# Patient Record
Sex: MALE | Race: BLACK OR AFRICAN AMERICAN | NOT HISPANIC OR LATINO | Employment: FULL TIME | URBAN - METROPOLITAN AREA
[De-identification: names, ages, dates, MRNs, and addresses within clinical notes are randomized per-mention and may not be internally consistent; named-entity substitution may affect disease eponyms.]

---

## 2021-12-16 ENCOUNTER — APPOINTMENT (OUTPATIENT)
Dept: CT IMAGING | Facility: CLINIC | Age: 61
End: 2021-12-16
Payer: COMMERCIAL

## 2021-12-16 ENCOUNTER — HOSPITAL ENCOUNTER (EMERGENCY)
Facility: CLINIC | Age: 61
Discharge: HOME | End: 2021-12-16
Admitting: PHYSICIAN ASSISTANT
Payer: COMMERCIAL

## 2021-12-16 VITALS
SYSTOLIC BLOOD PRESSURE: 168 MMHG | DIASTOLIC BLOOD PRESSURE: 104 MMHG | OXYGEN SATURATION: 96 % | HEART RATE: 93 BPM | WEIGHT: 195 LBS | RESPIRATION RATE: 16 BRPM | TEMPERATURE: 98.7 F
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
## 2021-12-16 DIAGNOSIS — S01.01XA LACERATION OF SCALP, INITIAL ENCOUNTER: ICD-10-CM

## 2021-12-16 DIAGNOSIS — S06.0X1A CONCUSSION WITH LOSS OF CONSCIOUSNESS OF 30 MINUTES OR LESS, INITIAL ENCOUNTER: ICD-10-CM

## 2021-12-16 PROCEDURE — 12001 RPR S/N/AX/GEN/TRNK 2.5CM/<: CPT

## 2021-12-16 PROCEDURE — 90715 TDAP VACCINE 7 YRS/> IM: CPT | Performed by: PHYSICIAN ASSISTANT

## 2021-12-16 PROCEDURE — 90471 IMMUNIZATION ADMIN: CPT | Performed by: PHYSICIAN ASSISTANT

## 2021-12-16 PROCEDURE — 99284 EMERGENCY DEPT VISIT MOD MDM: CPT | Mod: 25

## 2021-12-16 PROCEDURE — 70450 CT HEAD/BRAIN W/O DYE: CPT

## 2021-12-16 PROCEDURE — 250N000011 HC RX IP 250 OP 636: Performed by: PHYSICIAN ASSISTANT

## 2021-12-16 RX ADMIN — CLOSTRIDIUM TETANI TOXOID ANTIGEN (FORMALDEHYDE INACTIVATED), CORYNEBACTERIUM DIPHTHERIAE TOXOID ANTIGEN (FORMALDEHYDE INACTIVATED), BORDETELLA PERTUSSIS TOXOID ANTIGEN (GLUTARALDEHYDE INACTIVATED), BORDETELLA PERTUSSIS FILAMENTOUS HEMAGGLUTININ ANTIGEN (FORMALDEHYDE INACTIVATED), BORDETELLA PERTUSSIS PERTACTIN ANTIGEN, AND BORDETELLA PERTUSSIS FIMBRIAE 2/3 ANTIGEN 0.5 ML: 5; 2; 2.5; 5; 3; 5 INJECTION, SUSPENSION INTRAMUSCULAR at 12:58

## 2021-12-16 ASSESSMENT — ENCOUNTER SYMPTOMS
BACK PAIN: 0
NECK PAIN: 0
WOUND: 1

## 2021-12-16 NOTE — DISCHARGE INSTRUCTIONS
Laceration cleaned and approximated using skin glue. This will begin to flake off in about 5 days. Okay to get wet once completely dry. Do not apply any creams, ointments or lotions over the glue.   You likely have a concussion.  Symptoms of a concussion include: Headaches, fatigue, emotional lability (anxiety, sadness/tearfulness, mood swings, etc.), light sensitivity, sound sensitivity,and nausea.   Rest at home for the next few days to give your brain time to heal. Limit mental exertion, especially if you start to feel worsening of your symptoms.   No contact sports until symptoms arecompletely resolved. Additionally, avoid any activities that may cause a second injury to your brain. Be very cautious while on the ice.  For pain: You may use tylenol 650 mg and ibuprofen 600 mg every 6-8 hours as needed.   Return to the emergency department if you develop fevers, worsening headaches, new dizziness,vision changes, numbness/tingling/weakness in your arms or legs, vomiting, or any other concerning symptoms.

## 2021-12-16 NOTE — Clinical Note
Paul Garcia was seen and treated in our emergency department on 12/16/2021.  He may return to work on 12/18/2021.       If you have any questions or concerns, please don't hesitate to call.      Dianne Perera RN

## 2021-12-16 NOTE — ED PROVIDER NOTES
EMERGENCY DEPARTMENT ENCOUNTER      NAME: Paul Garcia  AGE: 61 year old male  YOB: 1960  MRN: [id number]  EVALUATION DATE & TIME: 12/16/2021 12:32 PM    PCP: No primary care provider on file.    ED PROVIDER: Stefanie Juarez PA-C      Chief Complaint   Patient presents with     Head Laceration     Fall         FINAL IMPRESSION:  1. Fall due to slipping on ice or snow, initial encounter    2. Concussion with loss of consciousness of 30 minutes or less, initial encounter    3. Laceration of scalp, initial encounter          MEDICAL DECISION MAKING:    Pertinent Labs & Imaging studies reviewed. (See chart for details)  61 year old male presents to the Emergency Department for evaluation after a fall from slipping on ice just prior to arrival. Patient fell backwards hitting his head. Reports brief loss of consciousness. Now has residual headache and a laceration to occipital scalp. Patient is not anticoagulated. Denies any other injuries.     Vitals reviewed and notable for elevated blood pressure, likely secondary to pain. GCS 15. Cervical spine cleared using NEXUS criteria. 2 cm superficial upside down Y shaped laceration to occipital scalp with tenderness to palpation. No other findings on head to toe trauma exam. Differential diagnosis includes but not limited to skull fracture, intracranial hemorrhage, concussion.     Fortunately, head CT unremarkable other than small posterior parieto-occipital scalp contusion. Tdap was updated. Laceration was repaired using surgical glue. Discussed concussion signs and symptoms and return precautions. Close follow up with PCP encouraged. Patient discharged home in stable condition.     0 minutes of critical care time     ED COURSE:  12:49 PM I met with the patient, obtained history, performed an initial exam, and discussed options and plan for diagnostics and treatment here in the ED.  2:17 PM Patient discharged after being provided with extensive anticipatory guidance  and given return precautions, importance of PCP follow-up emphasized.    At the conclusion of the encounter I discussed the results of all of the tests and the disposition. The questions were answered. The patient acknowledged understanding and was agreeable with the care plan.     MEDICATIONS GIVEN IN THE EMERGENCY:  Medications   Tdap (tetanus-diphtheria-acell pertussis) (ADACEL) injection 0.5 mL (0.5 mLs Intramuscular Given 12/16/21 1259)       NEW PRESCRIPTIONS STARTED AT TODAY'S ER VISIT  There are no discharge medications for this patient.           =================================================================    HPI:    Patient information was obtained from: patient     Use of Interpretor: N/A         Paul Garcia is a 61 year old male with no pertinent medical history who presents to this ED by walk in for evaluation of fall.    Patient reports he was walking on the ice when he slipped and fell backwards just prior to arrival. He hit the back of his head on the ground. He reports he lost consciousness for a few minutes and has laceration to scalp. Patient reports a headache. Denies any other injuries. Denies neck pain, back pain, difficulty walking, vision changes, dizziness, light headedness, confusion, nausea, vomiting or any other complaints at this time. Patient is not anticoagulated.     REVIEW OF SYSTEMS:  Review of Systems   Eyes: Negative for visual disturbance.   Respiratory: Negative for shortness of breath.    Cardiovascular: Negative for chest pain.   Gastrointestinal: Negative for abdominal pain, nausea and vomiting.   Musculoskeletal: Negative for back pain, gait problem and neck pain.   Skin: Positive for wound (laceration to back of head).   Neurological: Positive for syncope and headaches. Negative for dizziness, speech difficulty, weakness, light-headedness and numbness.   Hematological: Does not bruise/bleed easily.   Psychiatric/Behavioral: Negative for confusion.   All other systems  reviewed and are negative.    PAST MEDICAL HISTORY:  History reviewed. No pertinent past medical history.    PAST SURGICAL HISTORY:  History reviewed. No pertinent surgical history.    CURRENT MEDICATIONS:    No current facility-administered medications for this encounter.  No current outpatient medications on file.      ALLERGIES:  No Known Allergies    FAMILY HISTORY:  History reviewed. No pertinent family history.    SOCIAL HISTORY:   Social History     Socioeconomic History     Marital status: Not on file     Spouse name: Not on file     Number of children: Not on file     Years of education: Not on file     Highest education level: Not on file   Occupational History     Not on file   Tobacco Use     Smoking status: Not on file     Smokeless tobacco: Not on file   Substance and Sexual Activity     Alcohol use: Not on file     Drug use: Not on file     Sexual activity: Not on file   Other Topics Concern     Not on file   Social History Narrative     Not on file     Social Determinants of Health     Financial Resource Strain: Not on file   Food Insecurity: Not on file   Transportation Needs: Not on file   Physical Activity: Not on file   Stress: Not on file   Social Connections: Not on file   Intimate Partner Violence: Not on file   Housing Stability: Not on file       VITALS:  Patient Vitals for the past 24 hrs:   BP Temp Pulse Resp SpO2 Weight   12/16/21 1228 (!) 168/104 98.7  F (37.1  C) 93 16 96 % 88.5 kg (195 lb)       PHYSICAL EXAM   Constitutional: Well developed, Well nourished, NAD  HENT: Normocephalic. 2 cm superficial upside down Y shaped laceration to occipital scalp with tenderness to palpation. No active bleeding. No visualized foreign bodies. Small underlying scalp hematoma. No palpable depression in skull. Bilateral external ears normal, no hemotympanum. Oropharynx normal. Normal dentition. mucous membranes moist, Nose normal.   Neck: Normal range of motion, No midline cervical spine tenderness. No  pain with axial loading. Supple, No stridor.  Eyes: PERRL, EOMI, Conjunctiva normal, No discharge.   Respiratory: Normal breath sounds, No respiratory distress, No wheezing, Speaks full sentences easily. No cough.  Cardiovascular: Normal heart rate, Regular rhythm, No murmurs, No rubs, No gallops. Chest wall nontender.  GI: Soft, No tenderness, No masses, No flank tenderness. No rebound or guarding.  Musculoskeletal: 2+ DP pulses. No edema. No cyanosis, No clubbing. Good range of motion in all major joints. No tenderness to palpation or major deformities noted. No tenderness of the CTLS spine.   Integument: Warm, Dry, No erythema, No rash. No petechiae.  Neurologic: Patient is alert and oriented ×3. Face is symmetric. Speech is normal.  Visual fields are full. Cranial nerves II through XII are intact. Strength is full and equal in both upper and lower extremities. Sensory is intact. Patient has a normal steady gait. Coordination is intact.  Psychiatric: Affect normal, Judgment normal, Mood normal. Cooperative.    RADIOLOGY:  Reviewed all pertinent imaging. Please see official radiology report.  Head CT w/o contrast   Final Result   IMPRESSION:      No evidence of acute intracranial abnormality. No evidence of acute hemorrhage, mass or CT evidence of acute infarct.      Small posterior parieto-occipital scalp contusion. No acute fracture.        [facility]    -Laceration Repair    Date/Time: 12/16/2021 1:04 PM  Performed by: Stefanie Juarez PA-C  Authorized by: Stefanie Juarez PA-C     Risks, benefits and alternatives discussed.      ANESTHESIA (see MAR for exact dosages):     Anesthesia method:  None  LACERATION DETAILS     Location:  Scalp    Scalp location:  Occipital    Length (cm):  2    REPAIR TYPE:     Repair type:  Simple      EXPLORATION:     Wound exploration: entire depth of wound probed and visualized      Wound extent: no underlying fracture      Contaminated: no       TREATMENT:     Area cleansed with:  Hibiclens and saline    Amount of cleaning:  Standard    Irrigation solution:  Sterile saline    SKIN REPAIR     Repair method:  Tissue adhesive    APPROXIMATION     Approximation:  Close    POST-PROCEDURE DETAILS     Dressing:  Open (no dressing)        PROCEDURE    Patient Tolerance:  Patient tolerated the procedure well with no immediate complications      IGoyo, am serving as a scribe to document services personally performed by Stefanie Juarze PA-C based on my observation and the provider's statements to me. I, Stefanie Juarez PA-C attest that Goyo Navas is acting in a scribe capacity, has observed my performance of the services and has documented them in accordance with my direction.    Stefanie Juarez PA-C  Emergency Medicine  [facility]  12/16/2021     Stefanie Juarez PA-C  12/19/21 1918

## 2021-12-16 NOTE — ED TRIAGE NOTES
Patient is here with a head laceration. He fell on the ice prior to arrival. He stated he did have a LOC but does remember being on the ground and seeing black and could hear people around him.

## 2021-12-19 ASSESSMENT — ENCOUNTER SYMPTOMS
SHORTNESS OF BREATH: 0
HEADACHES: 1
DIZZINESS: 0
ABDOMINAL PAIN: 0
NUMBNESS: 0
VOMITING: 0
LIGHT-HEADEDNESS: 0
NAUSEA: 0
BRUISES/BLEEDS EASILY: 0
SPEECH DIFFICULTY: 0
WEAKNESS: 0
CONFUSION: 0

## 2023-02-28 ENCOUNTER — OFFICE VISIT (OUTPATIENT)
Dept: FAMILY MEDICINE | Facility: CLINIC | Age: 63
End: 2023-02-28
Payer: COMMERCIAL

## 2023-02-28 VITALS
OXYGEN SATURATION: 97 % | DIASTOLIC BLOOD PRESSURE: 80 MMHG | SYSTOLIC BLOOD PRESSURE: 142 MMHG | WEIGHT: 193.1 LBS | RESPIRATION RATE: 18 BRPM | HEIGHT: 70 IN | HEART RATE: 83 BPM | TEMPERATURE: 98.9 F | BODY MASS INDEX: 27.64 KG/M2

## 2023-02-28 DIAGNOSIS — R74.8 ELEVATED LIVER ENZYMES: ICD-10-CM

## 2023-02-28 DIAGNOSIS — Z11.59 NEED FOR HEPATITIS C SCREENING TEST: ICD-10-CM

## 2023-02-28 DIAGNOSIS — M1A.9XX0 CHRONIC GOUT WITHOUT TOPHUS, UNSPECIFIED CAUSE, UNSPECIFIED SITE: ICD-10-CM

## 2023-02-28 DIAGNOSIS — Z12.11 SCREEN FOR COLON CANCER: ICD-10-CM

## 2023-02-28 DIAGNOSIS — Z79.899 CURRENT USE OF PROTON PUMP INHIBITOR: ICD-10-CM

## 2023-02-28 DIAGNOSIS — Z11.4 SCREENING FOR HIV (HUMAN IMMUNODEFICIENCY VIRUS): ICD-10-CM

## 2023-02-28 DIAGNOSIS — F10.10 ETOH ABUSE: ICD-10-CM

## 2023-02-28 DIAGNOSIS — Z00.00 VISIT FOR PREVENTIVE HEALTH EXAMINATION: Primary | ICD-10-CM

## 2023-02-28 DIAGNOSIS — R12 CHRONIC HEARTBURN: ICD-10-CM

## 2023-02-28 DIAGNOSIS — I10 ESSENTIAL HYPERTENSION: ICD-10-CM

## 2023-02-28 DIAGNOSIS — N52.9 ERECTILE DYSFUNCTION, UNSPECIFIED ERECTILE DYSFUNCTION TYPE: ICD-10-CM

## 2023-02-28 DIAGNOSIS — Z63.4 DEATH OF LIFE PARTNER: ICD-10-CM

## 2023-02-28 DIAGNOSIS — E78.5 DYSLIPIDEMIA: ICD-10-CM

## 2023-02-28 PROBLEM — M25.562 CHRONIC PAIN OF LEFT KNEE: Status: ACTIVE | Noted: 2018-01-27

## 2023-02-28 PROBLEM — J96.01 ACUTE RESPIRATORY FAILURE WITH HYPOXIA (H): Status: ACTIVE | Noted: 2021-04-13

## 2023-02-28 PROBLEM — G89.29 CHRONIC PAIN OF LEFT KNEE: Status: ACTIVE | Noted: 2018-01-27

## 2023-02-28 PROBLEM — J12.82 PNEUMONIA DUE TO COVID-19 VIRUS: Status: ACTIVE | Noted: 2021-04-13

## 2023-02-28 PROBLEM — U07.1 PNEUMONIA DUE TO COVID-19 VIRUS: Status: ACTIVE | Noted: 2021-04-13

## 2023-02-28 PROBLEM — M10.9 GOUT: Status: ACTIVE | Noted: 2018-05-03

## 2023-02-28 LAB
ALBUMIN SERPL BCG-MCNC: 4.5 G/DL (ref 3.5–5.2)
ALP SERPL-CCNC: 125 U/L (ref 40–129)
ALT SERPL W P-5'-P-CCNC: 33 U/L (ref 10–50)
ANION GAP SERPL CALCULATED.3IONS-SCNC: 11 MMOL/L (ref 7–15)
AST SERPL W P-5'-P-CCNC: 32 U/L (ref 10–50)
BILIRUB SERPL-MCNC: 0.8 MG/DL
BUN SERPL-MCNC: 17.4 MG/DL (ref 8–23)
CALCIUM SERPL-MCNC: 10 MG/DL (ref 8.8–10.2)
CHLORIDE SERPL-SCNC: 105 MMOL/L (ref 98–107)
CHOLEST SERPL-MCNC: 210 MG/DL
CREAT SERPL-MCNC: 1.19 MG/DL (ref 0.67–1.17)
DEPRECATED HCO3 PLAS-SCNC: 26 MMOL/L (ref 22–29)
ERYTHROCYTE [DISTWIDTH] IN BLOOD BY AUTOMATED COUNT: 13.3 % (ref 10–15)
GFR SERPL CREATININE-BSD FRML MDRD: 69 ML/MIN/1.73M2
GLUCOSE SERPL-MCNC: 109 MG/DL (ref 70–99)
HBA1C MFR BLD: 5.4 % (ref 0–5.6)
HCT VFR BLD AUTO: 52.1 % (ref 40–53)
HDLC SERPL-MCNC: 49 MG/DL
HGB BLD-MCNC: 18.4 G/DL (ref 13.3–17.7)
LDLC SERPL CALC-MCNC: 132 MG/DL
MAGNESIUM SERPL-MCNC: 2.3 MG/DL (ref 1.7–2.3)
MCH RBC QN AUTO: 31.2 PG (ref 26.5–33)
MCHC RBC AUTO-ENTMCNC: 35.3 G/DL (ref 31.5–36.5)
MCV RBC AUTO: 89 FL (ref 78–100)
NONHDLC SERPL-MCNC: 161 MG/DL
PLATELET # BLD AUTO: 228 10E3/UL (ref 150–450)
POTASSIUM SERPL-SCNC: 3.8 MMOL/L (ref 3.4–5.3)
PROT SERPL-MCNC: 7.4 G/DL (ref 6.4–8.3)
PSA SERPL-MCNC: 2.25 NG/ML (ref 0–4.5)
RBC # BLD AUTO: 5.89 10E6/UL (ref 4.4–5.9)
SODIUM SERPL-SCNC: 142 MMOL/L (ref 136–145)
TRIGL SERPL-MCNC: 147 MG/DL
URATE SERPL-MCNC: 3.8 MG/DL (ref 3.4–7)
WBC # BLD AUTO: 7.7 10E3/UL (ref 4–11)

## 2023-02-28 PROCEDURE — 80053 COMPREHEN METABOLIC PANEL: CPT | Performed by: NURSE PRACTITIONER

## 2023-02-28 PROCEDURE — 83036 HEMOGLOBIN GLYCOSYLATED A1C: CPT | Performed by: NURSE PRACTITIONER

## 2023-02-28 PROCEDURE — 84550 ASSAY OF BLOOD/URIC ACID: CPT | Performed by: NURSE PRACTITIONER

## 2023-02-28 PROCEDURE — 86803 HEPATITIS C AB TEST: CPT | Performed by: NURSE PRACTITIONER

## 2023-02-28 PROCEDURE — 83735 ASSAY OF MAGNESIUM: CPT | Performed by: NURSE PRACTITIONER

## 2023-02-28 PROCEDURE — 99214 OFFICE O/P EST MOD 30 MIN: CPT | Mod: 25 | Performed by: NURSE PRACTITIONER

## 2023-02-28 PROCEDURE — 87389 HIV-1 AG W/HIV-1&-2 AB AG IA: CPT | Performed by: NURSE PRACTITIONER

## 2023-02-28 PROCEDURE — 85027 COMPLETE CBC AUTOMATED: CPT | Performed by: NURSE PRACTITIONER

## 2023-02-28 PROCEDURE — 36415 COLL VENOUS BLD VENIPUNCTURE: CPT | Performed by: NURSE PRACTITIONER

## 2023-02-28 PROCEDURE — 99386 PREV VISIT NEW AGE 40-64: CPT | Performed by: NURSE PRACTITIONER

## 2023-02-28 PROCEDURE — G0103 PSA SCREENING: HCPCS | Performed by: NURSE PRACTITIONER

## 2023-02-28 PROCEDURE — 80061 LIPID PANEL: CPT | Performed by: NURSE PRACTITIONER

## 2023-02-28 RX ORDER — ALLOPURINOL 300 MG/1
1 TABLET ORAL
COMMUNITY
Start: 2023-01-26 | End: 2023-02-28

## 2023-02-28 RX ORDER — AMLODIPINE BESYLATE 10 MG/1
10 TABLET ORAL
Qty: 90 TABLET | Refills: 4 | Status: SHIPPED | OUTPATIENT
Start: 2023-02-28 | End: 2024-05-13

## 2023-02-28 RX ORDER — AMLODIPINE BESYLATE 5 MG/1
1 TABLET ORAL
COMMUNITY
Start: 2022-12-28 | End: 2023-02-28

## 2023-02-28 RX ORDER — PREDNISONE 20 MG/1
2 TABLET ORAL PRN
COMMUNITY
Start: 2023-01-26

## 2023-02-28 RX ORDER — VALSARTAN 320 MG/1
320 TABLET ORAL
Qty: 90 TABLET | Refills: 3 | Status: SHIPPED | OUTPATIENT
Start: 2023-02-28 | End: 2024-05-13

## 2023-02-28 RX ORDER — ATORVASTATIN CALCIUM 20 MG/1
1 TABLET, FILM COATED ORAL
COMMUNITY
Start: 2022-07-27 | End: 2023-02-28 | Stop reason: SINTOL

## 2023-02-28 RX ORDER — ALLOPURINOL 300 MG/1
1 TABLET ORAL
Qty: 90 TABLET | Refills: 3 | Status: SHIPPED | OUTPATIENT
Start: 2023-02-28 | End: 2024-05-13

## 2023-02-28 RX ORDER — ALBUTEROL SULFATE 90 UG/1
2 AEROSOL, METERED RESPIRATORY (INHALATION)
COMMUNITY
Start: 2021-12-21 | End: 2024-06-25

## 2023-02-28 RX ORDER — VALSARTAN 320 MG/1
1 TABLET ORAL
COMMUNITY
Start: 2023-01-26 | End: 2023-02-28

## 2023-02-28 RX ORDER — SILDENAFIL CITRATE 20 MG/1
TABLET ORAL
COMMUNITY
Start: 2023-01-26 | End: 2023-04-27

## 2023-02-28 SDOH — SOCIAL STABILITY - SOCIAL INSECURITY: DISSAPEARANCE AND DEATH OF FAMILY MEMBER: Z63.4

## 2023-02-28 NOTE — PROGRESS NOTES
Assessment & Plan     Screen for colon cancer    - Colonoscopy Screening  Referral    Screening for HIV (human immunodeficiency virus)    - HIV Antigen Antibody Combo  - HIV Antigen Antibody Combo    Need for hepatitis C screening test    - Hepatitis C Screen Reflex to HCV RNA Quant and Genotype  - Hepatitis C Screen Reflex to HCV RNA Quant and Genotype    Current use of proton pump inhibitor    - omeprazole (PRILOSEC) 20 MG DR capsule  Dispense: 90 capsule; Refill: 4  - Magnesium  - Magnesium    Essential hypertension    - amLODIPine (NORVASC) 10 MG tablet  Dispense: 90 tablet; Refill: 4  - Comprehensive metabolic panel (BMP + Alb, Alk Phos, ALT, AST, Total. Bili, TP)  - CBC with platelets  - Comprehensive metabolic panel (BMP + Alb, Alk Phos, ALT, AST, Total. Bili, TP)  - valsartan (DIOVAN) 320 MG tablet  Dispense: 90 tablet; Refill: 3    Chronic gout without tophus, unspecified cause, unspecified site  0---stable, may continue Allopurinol   - Uric acid  - Uric acid  - allopurinol (ZYLOPRIM) 300 MG tablet  Dispense: 90 tablet; Refill: 3    Visit for preventive health examination    - Hemoglobin A1c  - PSA, screen  - Hemoglobin A1c  - PSA, screen  - CBC with platelets    ETOH abuse    - Primary Care - Care Coordination Referral    Death of life partner    - Primary Care - Care Coordination Referral    Elevated liver enzymes    - Comprehensive metabolic panel (BMP + Alb, Alk Phos, ALT, AST, Total. Bili, TP)  - Comprehensive metabolic panel (BMP + Alb, Alk Phos, ALT, AST, Total. Bili, TP)    Dyslipidemia    - Lipid panel reflex to direct LDL Non-fasting    Erectile dysfunction, unspecified erectile dysfunction type    - sildenafil (REVATIO) 20 MG tablet    Chronic heartburn    - omeprazole (PRILOSEC) 20 MG DR capsule  Dispense: 90 capsule; Refill: 4  - Magnesium      -We spent time today discussing his alcohol use and the complications that this can cause with the liver.  I reviewed his prior liver enzymes  with the patient today, and he is aware that they are elevated and this elevation chronically can cause fatty liver and cirrhosis.  I discussed cutting back on drinking to no more than 2 beers per evening setting, and patient is very reluctant to do this.  He is willing to have a care coordinator call him and discussed community resources that are available for him for drinking sensation.  I offered behavioral health as well today.  He declined any medications for depression or anxiety.  I discussed conservative management and warning signs of when to be seen.  I would like to follow-up with him within 3 months to readdress this and see how he is doing.    -Blood pressure came down marginally.  Goal for blood pressure is below 130/80, I will have him increase his Norvasc to 10 mg/day.  I encouraged a low-salt diet as well.   -He stopped his statin medication related to an upset stomach months to years ago.  We will recheck his cholesterol, and I went over American heart risk calculator today at the office visit.  I also spent time and discussed his prior cholesterol readings with him today and ways that he can improve his cholesterol through diet and exercise.   -It appears acid reflux is under control with omeprazole.  He is aware of the triggers for acid reflux such as drinking alcohol.   -It appears gout has been under control.  He had a uric acid completed in 2021 and it was stable.     Your labs are not too bad!! Good news!! Your liver function tests improved, and are now in a normal range. This still does not mean that your drinking is not affecting them. It is still suggested for you to cut down on drinking and getting appropriate help. If you ever have symptoms of alcohol withdrawal, please go to detox to safely stop. Stopping ETOH abruptly can cause seizures and death.     - your kidney function is stable, but your CR is up some. This might be related to not drinking enough water. You want to drink about  "6-10 glasses per day.     - your glucose is elevated, but not abnormal if you were not fasting. Recheck next year.     Your ASCVD score (see below) is still high. I suggest you restart your statin medication. I will order you a different statin medication. Let me know if you notice side effects. I will like to recheck your lipids in three months while taking the med.      The 10-year ASCVD risk score (Ashleigh WALKER, et al., 2019) is: 15.9%    Values used to calculate the score:      Age: 62 years      Sex: Male      Is Non- : No      Diabetic: No      Tobacco smoker: No      Systolic Blood Pressure: 142 mmHg      Is BP treated: Yes      HDL Cholesterol: 49 mg/dL      Total Cholesterol: 210 mg/dL             BMI:   Estimated body mass index is 28.11 kg/m  as calculated from the following:    Height as of this encounter: 1.765 m (5' 9.5\").    Weight as of this encounter: 87.6 kg (193 lb 1.6 oz).   Weight management plan: Discussed healthy diet and exercise guidelines        Return in about 3 months (around 5/28/2023) for Follow up, with me for BP, depression, etoh abuse.    GENE Alcocer CNP  [facility] CAROLYN Miller is a 62 year old, presenting for the following health issues:  Recheck Medication (Discuss current medications and establish care)    -Patient is present today to establish care, medication refills, and preventive care.  He was last seen at [facility] in 2021.  He has a history of COVID-pneumonia which required hospitalization, he has had it twice since the hospitalization without any complications.  He does not smoke, but does use marijuana.  He stated he does drink at least a sixpack of beer about every other day.  He thinks he does have a drinking problem.  He stated that his wife passed away about 9 years ago, and since this timeframe, he has been noticing drinking more and some depression.  He denied any suicidal or homicidal " "thoughts or plans. He has 4 children and they are all supportive.  He works part-time.  He was started on atorvastatin a couple years ago, but when he took it with his aspirin he noticed stomach pains so he stopped the medication.  He has a history of acid reflux, in which omeprazole usually helps it.  He checks his blood pressure at home and stated that it is always elevated, sometimes in the 1 50-1 60 range.  He will check it a second time and it usually will decrease to the 140 range.  He stated he does take his medications as directed.  He has a history of gout with no new gout flares.  If he does have a gout flare he will take prednisone as needed.   -History of shingles in the past.  He declined the vaccine today, despite my recommendation.   -He said he will get the COVID-vaccine, but had COVID recently in the past 3 months.     -He enjoys fishing.     HPI     Medication Followup of all current medications    Taking Medication as prescribed: yes    Side Effects:  None    Medication Helping Symptoms:  yes        Review of Systems   Constitutional, HEENT, cardiovascular, pulmonary, gi and gu systems are negative, except as otherwise noted.      Objective    BP (!) 142/80   Pulse 83   Temp 98.9  F (37.2  C) (Oral)   Resp 18   Ht 1.765 m (5' 9.5\")   Wt 87.6 kg (193 lb 1.6 oz)   SpO2 97%   BMI 28.11 kg/m    Body mass index is 28.11 kg/m .  Physical Exam   GENERAL: healthy, alert and no distress  EYES: Eyes grossly normal to inspection, PERRL and conjunctivae and sclerae normal  HENT: ear canals and TM's normal, nose and mouth without ulcers or lesions  NECK: no adenopathy, no asymmetry, masses, or scars and thyroid normal to palpation  RESP: lungs clear to auscultation - no rales, rhonchi or wheezes  CV: regular rate and rhythm, normal S1 S2, no S3 or S4, no murmur, click or rub, no peripheral edema and peripheral pulses strong  ABDOMEN: soft, nontender, no hepatosplenomegaly, no masses and bowel sounds " normal  MS: no gross musculoskeletal defects noted, no edema  SKIN: no suspicious lesions or rashes  NEURO: Normal strength and tone, mentation intact and speech normal  PSYCH: mentation appears normal, affect normal/bright    No results found for any previous visit.

## 2023-03-01 LAB
HCV AB SERPL QL IA: NONREACTIVE
HIV 1+2 AB+HIV1 P24 AG SERPL QL IA: NONREACTIVE

## 2023-03-02 ENCOUNTER — PATIENT OUTREACH (OUTPATIENT)
Dept: CARE COORDINATION | Facility: CLINIC | Age: 63
End: 2023-03-02
Payer: COMMERCIAL

## 2023-03-02 NOTE — PROGRESS NOTES
Clinic Care Coordination Contact    Situation: Care Coordinator contacted patient to provide resource information     Background: Per PCP note, patient accepting of CC referral for resources related to substance use and grief support    Assessment: CC SW spoke with pt who says he would like to wait to speak with CC until he receives his insurance card. He recently purchased a [employer] plan and needs to look in to mental health/chemical health benefits.  Pt is aware he needs to cut back on his drinking due to the impact on his health.       Plan/Recommendations: MARIAMA MON to send resource information via [employer] and follow up with pt next week for update on insurance benefits    [employer]  [phone]  [email or web address]    [facility]  [phone].  [email or web address]    [facility]- find meetings online  [email or web address]    [place] [facility]  [address]  [place] [address]  [phone]  [email or web address]

## 2023-03-02 NOTE — LETTER
[facility] COORDINATION  [address] [place] [address]    March 2, 2023    Paul Garcia  [address]   [place] [facility] [place] [facility] [place] [address]      Dear Paul,    I am a clinic care coordinator who works with GENE Alcocer CNP with the [facility]. I wanted to thank you for spending the time to talk with me.  Below  are the resources we discussed during our call.    [employer]  [phone]  [email or web address]    [employer]  [phone].  [email or web address]    [facility]- find meetings online  [email or web address]    [place] [facility]  [address]  [place] [address]  [phone]  [email or web address]    Please feel free to contact me with any questions or concerns regarding care coordination and what we can offer.      We are focused on providing you with the highest-quality healthcare experience possible.    Sincerely,     Lucila Jolly [facility]  Social Work Care Coordinator  [facility]   Javier Cano [place], [place], [facility]  [phone]

## 2023-03-03 ENCOUNTER — TELEPHONE (OUTPATIENT)
Dept: FAMILY MEDICINE | Facility: CLINIC | Age: 63
End: 2023-03-03
Payer: COMMERCIAL

## 2023-03-03 NOTE — TELEPHONE ENCOUNTER
S-(situation):   Pt would like to know what abnormal lab results mean    B-(background):   02/28/2023 lab tests. Abnormal results noted in lipid panel, CMP, and CBC with platelets    A-(assessment):   Writer communicated abnormal results to pt and what they mean. Answered questions as needed.    R-(recommendations):   Noted PCP will review results and will make commentary on them. Pt should get an message update in [employer] when this occurs. Pt denied further questions.    Tammy Yao RN

## 2023-03-04 RX ORDER — ROSUVASTATIN CALCIUM 5 MG/1
5 TABLET, COATED ORAL DAILY
Qty: 90 TABLET | Refills: 3 | Status: SHIPPED | OUTPATIENT
Start: 2023-03-04 | End: 2024-06-27

## 2023-03-05 NOTE — RESULT ENCOUNTER NOTE
Jonathon Miller    It was a pleasure meeting you!!!     Your labs are not too bad!! Good news!! Your liver function tests improved, and are now in a normal range. This still does not mean that your drinking is not affecting them. It is still suggested for you to cut down on drinking and getting appropriate help. If you ever have symptoms of alcohol withdrawal, please go to detox to safely stop. Stopping ETOH abruptly can cause seizures and death.     - your kidney function is stable, but your Creatinine is up some. This might be related to not drinking enough water. You want to drink about 6-10 glasses per day.     - your glucose is elevated, but not abnormal if you were not fasting. Recheck next year.     Your ASCVD score (see below) is still high. I suggest you restart your statin medication. I will order you a different statin medication. Let me know if you notice side effects. I will like to recheck your lipids in three months while taking the med.      The 10-year ASCVD risk score (Ashleigh WALKER, et al., 2019) is: 15.9%    Values used to calculate the score:      Age: 62 years      Sex: Male      Is Non- : No      Diabetic: No      Tobacco smoker: No      Systolic Blood Pressure: 142 mmHg      Is BP treated: Yes      HDL Cholesterol: 49 mg/dL      Total Cholesterol: 210 mg/dL    Any questions, please let me know! I would like to see how everything is going. Maybe follow up with me in two or three months. Or sooner if needed.    Barbara

## 2023-03-10 ENCOUNTER — PATIENT OUTREACH (OUTPATIENT)
Dept: CARE COORDINATION | Facility: CLINIC | Age: 63
End: 2023-03-10
Payer: COMMERCIAL

## 2023-03-10 NOTE — PROGRESS NOTES
Clinic Care Coordination Contact    Situation: Care Coordinator contacted pt to confirm resource information received via [employer]    Background: Pt referred to Care Coordination for chemcial health resources    Assessment: Pt shared he has not looked at [employer] but will do so.  He confirmed receiving [facility]  He will contact [facility] to learn more about mental health and chemical health benefits.  CC ELIESER advised he contact [employer] for chemical health assessment or therapy. CC ELIESER advised pt contact PCP clinic to provider health insurance information. Pt confirmed having clinic number.    Plan/Recommendations: Pt to contact care coordinator directly if additional resources needed in future. No further outreach calls.

## 2023-04-27 DIAGNOSIS — N52.9 ERECTILE DYSFUNCTION, UNSPECIFIED ERECTILE DYSFUNCTION TYPE: ICD-10-CM

## 2023-04-27 RX ORDER — SILDENAFIL CITRATE 20 MG/1
TABLET ORAL
Qty: 60 TABLET | Refills: 1 | Status: SHIPPED | OUTPATIENT
Start: 2023-04-27 | End: 2023-10-09

## 2023-04-27 NOTE — TELEPHONE ENCOUNTER
Prescription approved per [facility] Refill Protocol.    MEMO Garcia [facility]

## 2023-10-09 ENCOUNTER — LAB (OUTPATIENT)
Dept: FAMILY MEDICINE | Facility: CLINIC | Age: 63
End: 2023-10-09

## 2023-10-09 ENCOUNTER — OFFICE VISIT (OUTPATIENT)
Dept: FAMILY MEDICINE | Facility: CLINIC | Age: 63
End: 2023-10-09
Payer: COMMERCIAL

## 2023-10-09 VITALS
OXYGEN SATURATION: 98 % | HEART RATE: 69 BPM | HEIGHT: 70 IN | TEMPERATURE: 97.3 F | BODY MASS INDEX: 27.11 KG/M2 | SYSTOLIC BLOOD PRESSURE: 136 MMHG | DIASTOLIC BLOOD PRESSURE: 84 MMHG | WEIGHT: 189.4 LBS | RESPIRATION RATE: 12 BRPM

## 2023-10-09 DIAGNOSIS — M25.532 LEFT WRIST PAIN: ICD-10-CM

## 2023-10-09 DIAGNOSIS — Z12.11 SCREENING FOR COLON CANCER: ICD-10-CM

## 2023-10-09 DIAGNOSIS — N40.0 BENIGN PROSTATIC HYPERPLASIA WITHOUT LOWER URINARY TRACT SYMPTOMS: Primary | ICD-10-CM

## 2023-10-09 DIAGNOSIS — I10 ESSENTIAL HYPERTENSION: ICD-10-CM

## 2023-10-09 PROBLEM — J96.01 ACUTE RESPIRATORY FAILURE WITH HYPOXIA (H): Status: RESOLVED | Noted: 2021-04-13 | Resolved: 2023-10-09

## 2023-10-09 PROCEDURE — 99214 OFFICE O/P EST MOD 30 MIN: CPT | Performed by: INTERNAL MEDICINE

## 2023-10-09 RX ORDER — TADALAFIL 5 MG/1
5 TABLET ORAL EVERY 24 HOURS
Qty: 90 TABLET | Refills: 3 | Status: SHIPPED | OUTPATIENT
Start: 2023-10-09

## 2023-10-09 NOTE — PATIENT INSTRUCTIONS
Check for a wrist brace at the pharmacy- want neoprene type brace    I think you had a ganglion cyst that ruptured and went away    Ok to continue with icing on the area. Can try topical voltaren gel over the area to prevent swelling too.     If it returns- we can have you see orthopedics at [facility].    Continue to follow BLOOD PRESSURE at home    Try to cut down on fluid intake before bed but we will start cialis 5 mg per day to help with urination and prostate symptoms. This will be instead of the viagra (sildenafil)    Sent in cologuard testing when you get it.    Abhishek Hernandes MD

## 2023-10-09 NOTE — PROGRESS NOTES
"  Assessment & Plan     Benign prostatic hyperplasia without lower urinary tract symptoms  Will trial Cialis for help with the symptoms.  Reports unchanged from before.  Discussed risk benefits of a digital rectal exam he agrees with declining today.  May consider further evaluation should he have ongoing symptoms he would like to try the Cialis versus Flomax due to potential side effects of lightheadedness or dizziness with the Flomax.  We will recheck PSA test if he is has ongoing symptoms and consider UA.  - tadalafil (CIALIS) 5 MG tablet; Take 1 tablet (5 mg) by mouth every 24 hours    Essential hypertension  Will continue to follow at home.  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    Left wrist pain  Suspect this was related to a ganglion cyst that is since disappeared.  Discussed warning signs for recheck will trial neoprene sleeve over the area.    Screening for colon cancer  Discussed screening modalities he would like to go with Cologuard.  - COLOGUARD([employer]); Future    Patient Instructions   Check for a wrist brace at the pharmacy- want neoprene type brace    I think you had a ganglion cyst that ruptured and went away    Ok to continue with icing on the area. Can try topical voltaren gel over the area to prevent swelling too.     If it returns- we can have you see orthopedics at [facility].    Continue to follow BLOOD PRESSURE at home    Try to cut down on fluid intake before bed but we will start cialis 5 mg per day to help with urination and prostate symptoms. This will be instead of the viagra (sildenafil)    Sent in cologuard testing when you get it.    Abhishek Hernandes MD    BMI:   Estimated body mass index is 27.57 kg/m  as calculated from the following:    Height as of this encounter: 1.765 m (5' 9.5\").    Weight as of this encounter: 85.9 kg (189 lb 6.4 oz).   Weight management plan: Discussed healthy diet and exercise " guidelines        Abhishek Hernandes MD  [facility] CAROLYN Miller is a 63 year old, presenting for the following health issues:  Wrist Problem (Bump on left wrist, was painful and swollen, now seems to be getting slightly better, would like looked at still. )        10/9/2023     3:02 PM   Additional Questions   Roomed by Veronica CHAMBERS MA       History of Present Illness       Reason for visit:  Wrist pain  Symptom onset:  3-7 days ago  Symptom intensity:  Moderate  Symptom progression:  Improving  Had these symptoms before:  No  What makes it worse:  Activity  What makes it better:  Not use    He eats 2-3 servings of fruits and vegetables daily.He consumes 2 sweetened beverage(s) daily.He exercises with enough effort to increase his heart rate 10 to 19 minutes per day.  He exercises with enough effort to increase his heart rate 3 or less days per week. He is missing 2 dose(s) of medications per week.  He is not taking prescribed medications regularly due to other.     Noted cyst over the left wrist area near the extensor pollicis longus area.  He notes that swelling went away was initially like a ball and then went away he has been icing area doing some repetitive movement at work that may have triggered.  No weakness in the hands.  Has not been taking over-the-counter's for this.    He is having some urination symptoms increased urination.  Same from when he was seen in February.  Did have a PSA done at that time that was normal.  No hematuria.  He does drink beer and notes that this will make it worse at times and often will drink fluids until before bedtime does not feel able to cut down.  He is using Viagra for as needed to help with erections.  Otherwise he is feeling well.    Review of Systems   Constitutional, HEENT, cardiovascular, pulmonary, gi and gu systems are negative, except as otherwise noted.      Objective    /84   Pulse 69   Temp 97.3  F (36.3  C)  "(Temporal)   Resp 12   Ht 1.765 m (5' 9.5\")   Wt 85.9 kg (189 lb 6.4 oz)   SpO2 98%   BMI 27.57 kg/m    Body mass index is 27.57 kg/m .  Physical Exam   General: alert, interactive, NAD  HEENT: sclerae clear  Neck: appears supple  Resp: breathing regular and unlabored, speaking in full sentences  MSk: Mild swelling over the lateral wrist area no clear tenderness or step-offs.  Normal strength.  No rashes noted.  Ext: warm and well perfused without edema                        "

## 2023-12-03 LAB — NONINV COLON CA DNA+OCC BLD SCRN STL QL: POSITIVE

## 2023-12-04 ENCOUNTER — TELEPHONE (OUTPATIENT)
Dept: FAMILY MEDICINE | Facility: CLINIC | Age: 63
End: 2023-12-04
Payer: COMMERCIAL

## 2023-12-04 DIAGNOSIS — Z12.11 SCREENING FOR COLON CANCER: Primary | ICD-10-CM

## 2023-12-04 NOTE — TELEPHONE ENCOUNTER
Pt calling in stating in his mychart he could see that his cologaurd results were back and stated that they were abnormal but that is all he could see, he was wondering what that meant.     Writer asked if he could see Dr. Hernandes' mychart message. He states he did not see it. Writer relayed Dr. Greta ford message and educated to call [facility] to schedule colonoscopy if he doesn't hear anything from them in next 2-3 business days. Gave [phone] number to call and stated this is their generic line and that depending on where he lives there are multiple locations.     Educated also to check with his insurance to make sure [facility] is in his network.     No further questions

## 2024-01-29 ENCOUNTER — PATIENT OUTREACH (OUTPATIENT)
Dept: CARE COORDINATION | Facility: CLINIC | Age: 64
End: 2024-01-29

## 2024-02-12 ENCOUNTER — PATIENT OUTREACH (OUTPATIENT)
Dept: CARE COORDINATION | Facility: CLINIC | Age: 64
End: 2024-02-12

## 2024-05-13 DIAGNOSIS — M1A.9XX0 CHRONIC GOUT WITHOUT TOPHUS, UNSPECIFIED CAUSE, UNSPECIFIED SITE: ICD-10-CM

## 2024-05-13 DIAGNOSIS — I10 ESSENTIAL HYPERTENSION: ICD-10-CM

## 2024-05-13 RX ORDER — VALSARTAN 320 MG/1
320 TABLET ORAL
Qty: 30 TABLET | Refills: 0 | Status: SHIPPED | OUTPATIENT
Start: 2024-05-13 | End: 2024-06-27

## 2024-05-13 RX ORDER — AMLODIPINE BESYLATE 10 MG/1
10 TABLET ORAL
Qty: 90 TABLET | Refills: 0 | Status: SHIPPED | OUTPATIENT
Start: 2024-05-13 | End: 2024-06-27

## 2024-05-13 RX ORDER — PREDNISONE 20 MG/1
40 TABLET ORAL PRN
OUTPATIENT
Start: 2024-05-13

## 2024-05-13 RX ORDER — ALLOPURINOL 300 MG/1
1 TABLET ORAL
Qty: 30 TABLET | Refills: 0 | Status: SHIPPED | OUTPATIENT
Start: 2024-05-13 | End: 2024-06-27

## 2024-05-13 NOTE — TELEPHONE ENCOUNTER
"  Medication Question or Refill    Contacts         Type Contact Phone/Fax    05/13/2024 09:17 AM CDT Phone (Incoming) Paul Garcia (Self) [phone] (M)        Patient reports prescriptions need to be renewed.  Patient is scheduled for a welcome to medicare physical on 6/25/24, first available appointment.    Patient is asking if provider will bridge or renew the following medications until his appointment?    Please Advise patient if PCP is unable to refill.    What medication are you calling about (include dose and sig)?:     valsartan (DIOVAN) 320 MG tablet 90 tablet 3 2/28/2023 -- No  Sig - Route: Take 1 tablet (320 mg) by mouth daily at 2 pm - Oral  Sent to pharmacy as: Valsartan 320 MG Oral Tablet (DIOVAN)  Class: E-Prescribe     amLODIPine (NORVASC) 10 MG tablet 90 tablet 4 2/28/2023 -- No  Sig - Route: Take 1 tablet (10 mg) by mouth daily at 2 pm - Oral  Sent to pharmacy as: amLODIPine Besylate 10 MG Oral Tablet (NORVASC)    allopurinol (ZYLOPRIM) 300 MG tablet 90 tablet 3 2/28/2023 -- No  Sig - Route: Take 1 tablet (300 mg) by mouth daily at 2 pm - Oral  Sent to pharmacy as: Allopurinol 300 MG Oral Tablet (ZYLOPRIM)    predniSONE (DELTASONE) 20 MG tablet -- -- 1/26/2023 -- --  Sig - Route: Take 2 tablets by mouth as needed Only for gout flares - Ora    Preferred Pharmacy:     [facility] [address] [place] [address] SO.  [address] [place] [address].  [facility] [address]  Phone: [phone] Fax: [phone]      Controlled Substance Agreement on file:   CSA -- Patient Level:    CSA: None found at the patient level.       Who prescribed the medication?: Ruth Nicolas    Do you need a refill? Yes    When did you use the medication last? \"Out and maybe 1 or 2 of the BP med\"    Patient offered an appointment? Yes: already has appointment scheduled.    Do you have any questions or concerns?  No      Could we send this information to you in [facility] or would you prefer to " receive a phone call?:   Patient would prefer a phone call   Okay to leave a detailed message?: Yes at Home number on file [phone] (home)

## 2024-06-25 ENCOUNTER — OFFICE VISIT (OUTPATIENT)
Dept: FAMILY MEDICINE | Facility: CLINIC | Age: 64
End: 2024-06-25
Payer: MEDICARE

## 2024-06-25 VITALS
HEART RATE: 74 BPM | SYSTOLIC BLOOD PRESSURE: 154 MMHG | TEMPERATURE: 98.7 F | BODY MASS INDEX: 27.7 KG/M2 | DIASTOLIC BLOOD PRESSURE: 79 MMHG | OXYGEN SATURATION: 98 % | WEIGHT: 187 LBS | RESPIRATION RATE: 12 BRPM | HEIGHT: 69 IN

## 2024-06-25 DIAGNOSIS — R19.5 POSITIVE COLORECTAL CANCER SCREENING USING COLOGUARD TEST: ICD-10-CM

## 2024-06-25 DIAGNOSIS — R22.9 LOCALIZED SUPERFICIAL SWELLING, MASS, OR LUMP: ICD-10-CM

## 2024-06-25 DIAGNOSIS — Z12.5 SCREENING FOR PROSTATE CANCER: ICD-10-CM

## 2024-06-25 DIAGNOSIS — Z86.16 HISTORY OF COVID-19: ICD-10-CM

## 2024-06-25 DIAGNOSIS — R35.1 NOCTURIA: ICD-10-CM

## 2024-06-25 DIAGNOSIS — F10.10 ETOH ABUSE: ICD-10-CM

## 2024-06-25 DIAGNOSIS — Z80.42 FAMILY HISTORY OF PROSTATE CANCER: ICD-10-CM

## 2024-06-25 DIAGNOSIS — M1A.9XX0 CHRONIC GOUT WITHOUT TOPHUS, UNSPECIFIED CAUSE, UNSPECIFIED SITE: ICD-10-CM

## 2024-06-25 DIAGNOSIS — R06.81 CHRONIC BREATHLESSNESS: ICD-10-CM

## 2024-06-25 DIAGNOSIS — Z00.00 VISIT FOR PREVENTIVE HEALTH EXAMINATION: ICD-10-CM

## 2024-06-25 DIAGNOSIS — E78.5 DYSLIPIDEMIA: ICD-10-CM

## 2024-06-25 DIAGNOSIS — R93.5 ABNORMAL US (ULTRASOUND) OF ABDOMEN: ICD-10-CM

## 2024-06-25 DIAGNOSIS — Z00.00 MEDICARE WELCOME EXAM: Primary | ICD-10-CM

## 2024-06-25 DIAGNOSIS — N40.0 BENIGN PROSTATIC HYPERPLASIA WITHOUT LOWER URINARY TRACT SYMPTOMS: ICD-10-CM

## 2024-06-25 DIAGNOSIS — Z12.11 SCREEN FOR COLON CANCER: ICD-10-CM

## 2024-06-25 DIAGNOSIS — I10 ESSENTIAL HYPERTENSION: ICD-10-CM

## 2024-06-25 LAB
ERYTHROCYTE [DISTWIDTH] IN BLOOD BY AUTOMATED COUNT: 12.4 % (ref 10–15)
HCT VFR BLD AUTO: 49 % (ref 40–53)
HGB BLD-MCNC: 17.4 G/DL (ref 13.3–17.7)
MCH RBC QN AUTO: 31.6 PG (ref 26.5–33)
MCHC RBC AUTO-ENTMCNC: 35.5 G/DL (ref 31.5–36.5)
MCV RBC AUTO: 89 FL (ref 78–100)
PLATELET # BLD AUTO: 233 10E3/UL (ref 150–450)
RBC # BLD AUTO: 5.5 10E6/UL (ref 4.4–5.9)
WBC # BLD AUTO: 10.6 10E3/UL (ref 4–11)

## 2024-06-25 PROCEDURE — G0402 INITIAL PREVENTIVE EXAM: HCPCS | Performed by: NURSE PRACTITIONER

## 2024-06-25 PROCEDURE — 80061 LIPID PANEL: CPT | Performed by: NURSE PRACTITIONER

## 2024-06-25 PROCEDURE — 84550 ASSAY OF BLOOD/URIC ACID: CPT | Performed by: NURSE PRACTITIONER

## 2024-06-25 PROCEDURE — 36415 COLL VENOUS BLD VENIPUNCTURE: CPT | Performed by: NURSE PRACTITIONER

## 2024-06-25 PROCEDURE — 85027 COMPLETE CBC AUTOMATED: CPT | Performed by: NURSE PRACTITIONER

## 2024-06-25 PROCEDURE — 80053 COMPREHEN METABOLIC PANEL: CPT | Performed by: NURSE PRACTITIONER

## 2024-06-25 PROCEDURE — 90677 PCV20 VACCINE IM: CPT | Performed by: NURSE PRACTITIONER

## 2024-06-25 PROCEDURE — 99214 OFFICE O/P EST MOD 30 MIN: CPT | Mod: 25 | Performed by: NURSE PRACTITIONER

## 2024-06-25 PROCEDURE — G0009 ADMIN PNEUMOCOCCAL VACCINE: HCPCS | Performed by: NURSE PRACTITIONER

## 2024-06-25 PROCEDURE — G0103 PSA SCREENING: HCPCS | Performed by: NURSE PRACTITIONER

## 2024-06-25 RX ORDER — ALBUTEROL SULFATE 90 UG/1
1-2 AEROSOL, METERED RESPIRATORY (INHALATION) EVERY 6 HOURS PRN
Qty: 8.5 G | Refills: 1 | Status: SHIPPED | OUTPATIENT
Start: 2024-06-25

## 2024-06-25 SDOH — HEALTH STABILITY: PHYSICAL HEALTH: ON AVERAGE, HOW MANY DAYS PER WEEK DO YOU ENGAGE IN MODERATE TO STRENUOUS EXERCISE (LIKE A BRISK WALK)?: 3 DAYS

## 2024-06-25 ASSESSMENT — SOCIAL DETERMINANTS OF HEALTH (SDOH): HOW OFTEN DO YOU GET TOGETHER WITH FRIENDS OR RELATIVES?: ONCE A WEEK

## 2024-06-25 ASSESSMENT — ACTIVITIES OF DAILY LIVING (ADL): CURRENT_FUNCTION: NO ASSISTANCE NEEDED

## 2024-06-25 NOTE — PROGRESS NOTES
Preventive Care Visit  [facility] GENE Vazquez CNP, Family Medicine  Jun 25, 2024      SUBJECTIVE:   Paul is a 64 year old, presenting for the following:  Physical (Welcome to medicare visit, prostate concerns, ) and Derm Problem (Mole on left shoulder)        6/25/2024    12:42 PM   Additional Questions   Roomed by Toni Garcia; three times per night. Tried Cialis but not too sure if helpful since did not stay on it consistently. Now drinks about 3 or more beers and drinks at night. Cut down by 50% compared to last year. Trying to switch to wine now. One brother had prostate cancer. Another brother has an enlarged prostate. Will drink water, pop, juice. Not sure if he snores. Noted a hard lump near epigastric area about 1-2 years ago. Not painful. No redness. GERD is intermittent, take PPI PRN. No weight loss, nausea, choking on foods, etc. HO Covid and was on oxygen (admitted). He will still feel breathless at times and will use his inhaler. Not sure if he needs to use it, but he does. No wheezing. Smoking marijuana. Not sure if related.   -not fasting today  - BP at home 140-150.  - works part-time.     Are you in the first 12 months of your Medicare coverage?  Yes,  Visual Acuity:  Right Eye: 20/25   Left Eye: 20/25  Both Eyes: 20/25    Healthy Habits:     In general, how would you rate your overall health?  Good    Frequency of exercise:  2-3 days/week    Duration of exercise:  15-30 minutes    Taking medications regularly:  Yes    Barriers to taking medications:  None    Medication side effects:  None    Ability to successfully perform activities of daily living:  No assistance needed    Home Safety:  No safety concerns identified    Hearing Impairment:  No hearing concerns    In the past 6 months, have you been bothered by leaking of urine?  No    In general, how would you rate your overall mental or emotional health?  Very good    Additional concerns today:   No      Today's PHQ-2 Score:       6/25/2024    12:33 PM   PHQ-2 ( 1999 Pfizer)   Q1: Little interest or pleasure in doing things 0   Q2: Feeling down, depressed or hopeless 1   PHQ-2 Score 1   Q1: Little interest or pleasure in doing things Not at all   Q2: Feeling down, depressed or hopeless Several days   PHQ-2 Score 1           Have you ever done Advance Care Planning? (For example, a Health Directive, POLST, or a discussion with a medical provider or your loved ones about your wishes): Yes, patient states has an Advance Care Planning document and will bring a copy to the clinic.       Fall risk  Fallen 2 or more times in the past year?: No  click delete button to remove this line now  Cognitive Screening   1) Repeat 3 items (Leader, Season, Table)    2) Clock draw: NORMAL  3) 3 item recall: Recalls 3 objects  Results: 3 items recalled: COGNITIVE IMPAIRMENT LESS LIKELY    Mini-CogTM Copyright NANCY Soto. Licensed by the author for use in [facility]; reprinted with permission (kaila@[facility]). All rights reserved.      Do you have sleep apnea, excessive snoring or daytime drowsiness? : no    Reviewed and updated as needed this visit by clinical staff   Tobacco  Allergies  Meds              Reviewed and updated as needed this visit by Provider                  Social History     Tobacco Use    Smoking status: Never    Smokeless tobacco: Never   Substance Use Topics    Alcohol use: Yes     Comment: 6 beers every other day             6/25/2024    12:32 PM   Alcohol Use   Prescreen: >3 drinks/day or >7 drinks/week? Yes   AUDIT SCORE  12         6/25/2024    12:32 PM   AUDIT - Alcohol Use Disorders Identification Test - Reproduced from the World Health Organization Audit 2001 (Second Edition)   1.  How often do you have a drink containing alcohol? 4 or more times a week   2.  How many drinks containing alcohol do you have on a typical day when you are drinking? 3 or 4   3.  How often do you have five or  more drinks on one occasion? Weekly   4.  How often during the last year have you found that you were not able to stop drinking once you had started? Never   5.  How often during the last year have you failed to do what was normally expected of you because of drinking? Never   6.  How often during the last year have you needed a first drink in the morning to get yourself going after a heavy drinking session? Never   7.  How often during the last year have you had a feeling of guilt or remorse after drinking? Never   8.  How often during the last year have you been unable to remember what happened the night before because of your drinking? Never   9.  Have you or someone else been injured because of your drinking? No   10. Has a relative, friend, doctor or other health care worker been concerned about your drinking or suggested you cut down? Yes, during the last year   TOTAL SCORE 12     Do you have a current opioid prescription? No  Do you use any other controlled substances or medications that are not prescribed by a provider? None    Current providers sharing in care for this patient include:   Patient Care Team:  Ruth Nicolas APRN CNP as PCP - General (Family Medicine)  Ruth Nicolas APRN CNP as Assigned PCP    The following health maintenance items are reviewed in Epic and correct as of today:  Health Maintenance   Topic Date Due    ZOSTER IMMUNIZATION (1 of 2) Never done    RSV VACCINE (Pregnancy & 60+) (1 - 1-dose 60+ series) Never done    COVID-19 Vaccine (3 - Pfizer risk series) 09/21/2021    COLORECTAL CANCER SCREENING  11/28/2023    MEDICARE ANNUAL WELLNESS VISIT  04/29/2024    INFLUENZA VACCINE (Season Ended) 09/01/2024    LIPID  06/25/2025    ANNUAL REVIEW OF HM ORDERS  06/25/2025    FALL RISK ASSESSMENT  06/25/2025    GLUCOSE  06/25/2027    ADVANCE CARE PLANNING  06/27/2029    DTAP/TDAP/TD IMMUNIZATION (3 - Td or Tdap) 12/16/2031    HEPATITIS C SCREENING  Completed    HIV SCREENING  Completed  "   PHQ-2 (once per calendar year)  Completed    Pneumococcal Vaccine: 65+ Years  Completed    IPV IMMUNIZATION  Aged Out    HPV IMMUNIZATION  Aged Out    MENINGITIS IMMUNIZATION  Aged Out    RSV MONOCLONAL ANTIBODY  Aged Out     Lab work is in process  Labs reviewed in EPIC  BP Readings from Last 3 Encounters:   06/25/24 (!) 154/79   10/09/23 136/84   02/28/23 (!) 142/80    Wt Readings from Last 3 Encounters:   06/25/24 84.8 kg (187 lb)   10/09/23 85.9 kg (189 lb 6.4 oz)   02/28/23 87.6 kg (193 lb 1.6 oz)               Review of Systems     Review of Systems  Constitutional, HEENT, cardiovascular, pulmonary, gi and gu systems are negative, except as otherwise noted.    OBJECTIVE:   BP (!) 154/79   Pulse 74   Temp 98.7  F (37.1  C) (Oral)   Resp 12   Ht 1.753 m (5' 9\")   Wt 84.8 kg (187 lb)   SpO2 98%   BMI 27.62 kg/m     Estimated body mass index is 27.57 kg/m  as calculated from the following:    Height as of 10/9/23: 1.765 m (5' 9.5\").    Weight as of 10/9/23: 85.9 kg (189 lb 6.4 oz).  Physical Exam  Vitals and nursing note reviewed.   Constitutional:       Appearance: Normal appearance. He is not ill-appearing.   HENT:      Mouth/Throat:      Mouth: Mucous membranes are moist.   Cardiovascular:      Rate and Rhythm: Normal rate and regular rhythm.   Pulmonary:      Effort: Pulmonary effort is normal.      Breath sounds: Normal breath sounds.   Abdominal:      General: Abdomen is flat. There is no distension.      Palpations: Abdomen is soft. There is no hepatomegaly or splenomegaly.      Tenderness: There is no abdominal tenderness.       Musculoskeletal:      Cervical back: Normal range of motion. No rigidity.      Right lower leg: No edema.      Left lower leg: No edema.   Lymphadenopathy:      Cervical: No cervical adenopathy.   Skin:     General: Skin is warm.      Capillary Refill: Capillary refill takes less than 2 seconds.      Coloration: Skin is not jaundiced.   Neurological:      General: No " focal deficit present.      Mental Status: He is alert and oriented to person, place, and time.      Cranial Nerves: No cranial nerve deficit.   Psychiatric:         Mood and Affect: Mood normal.         Behavior: Behavior normal.         Thought Content: Thought content normal.         Judgment: Judgment normal.         Diagnostic Test Results:  Labs reviewed in Epic  Results for orders placed or performed in visit on 06/25/24   Lipid panel reflex to direct LDL Non-fasting     Status: Abnormal   Result Value Ref Range    Cholesterol 183 <200 mg/dL    Triglycerides 86 <150 mg/dL    Direct Measure HDL 49 >=40 mg/dL    LDL Cholesterol Calculated 117 (H) <=100 mg/dL    Non HDL Cholesterol 134 (H) <130 mg/dL    Patient Fasting > 8hrs? Unknown     Narrative    Cholesterol  Desirable:  <200 mg/dL    Triglycerides  Normal:  Less than 150 mg/dL  Borderline High:  150-199 mg/dL  High:  200-499 mg/dL  Very High:  Greater than or equal to 500 mg/dL    Direct Measure HDL  Female:  Greater than or equal to 50 mg/dL   Male:  Greater than or equal to 40 mg/dL    LDL Cholesterol  Desirable:  <100mg/dL  Above Desirable:  100-129 mg/dL   Borderline High:  130-159 mg/dL   High:  160-189 mg/dL   Very High:  >= 190 mg/dL    Non HDL Cholesterol  Desirable:  130 mg/dL  Above Desirable:  130-159 mg/dL  Borderline High:  160-189 mg/dL  High:  190-219 mg/dL  Very High:  Greater than or equal to 220 mg/dL   PSA, screen     Status: Normal   Result Value Ref Range    Prostate Specific Antigen Screen 2.23 0.00 - 4.50 ng/mL    Narrative    This result is obtained using the Roche Elecsys total PSA method on the alex e801 immunoassay analyzer. Results obtained with different assay methods or kits cannot be used interchangeably.   Comprehensive metabolic panel (BMP + Alb, Alk Phos, ALT, AST, Total. Bili, TP)     Status: Abnormal   Result Value Ref Range    Sodium 139 135 - 145 mmol/L    Potassium 3.5 3.4 - 5.3 mmol/L    Carbon Dioxide (CO2) 24 22 -  29 mmol/L    Anion Gap 11 7 - 15 mmol/L    Urea Nitrogen 18.3 8.0 - 23.0 mg/dL    Creatinine 1.24 (H) 0.67 - 1.17 mg/dL    GFR Estimate 65 >60 mL/min/1.73m2    Calcium 9.4 8.8 - 10.2 mg/dL    Chloride 104 98 - 107 mmol/L    Glucose 91 70 - 99 mg/dL    Alkaline Phosphatase 122 40 - 150 U/L    AST 23 0 - 45 U/L    ALT 22 0 - 70 U/L    Protein Total 7.5 6.4 - 8.3 g/dL    Albumin 4.4 3.5 - 5.2 g/dL    Bilirubin Total 1.2 <=1.2 mg/dL    Patient Fasting > 8hrs? Unknown    CBC with platelets     Status: Normal   Result Value Ref Range    WBC Count 10.6 4.0 - 11.0 10e3/uL    RBC Count 5.50 4.40 - 5.90 10e6/uL    Hemoglobin 17.4 13.3 - 17.7 g/dL    Hematocrit 49.0 40.0 - 53.0 %    MCV 89 78 - 100 fL    MCH 31.6 26.5 - 33.0 pg    MCHC 35.5 31.5 - 36.5 g/dL    RDW 12.4 10.0 - 15.0 %    Platelet Count 233 150 - 450 10e3/uL   Uric acid     Status: Normal   Result Value Ref Range    Uric Acid 4.0 3.4 - 7.0 mg/dL         EXAM: US ABD RUQ ORGANS  LOCATION: [facility]  DATE/TIME: 4/9/2021 8:28 PM    INDICATION: Nausea, abnormal lfts, Covid positive.  COMPARISON: None.  TECHNIQUE: Limited abdominal ultrasound.    FINDINGS:    GALLBLADDER: The gallbladder is incompletely distended accentuating wall thickening. Small amount of biliary sludge is seen. No obvious stones, pericholecystic fluid, or sonographic Odell sign.    BILE DUCTS: No biliary dilatation. The common duct measures 4 mm.    LIVER: Coarsened echotexture, suggesting underlying fibrosis. Smooth contour. No focal mass. The portal vein is patent with flow in the normal direction.    RIGHT KIDNEY: No hydronephrosis.    PANCREAS: The visualized portions are normal.    No ascites.    IMPRESSION:  1.  Contracted gallbladder accentuating wall thickening. Otherwise, no sonographic evidence of acute cholecystitis.  2.  Coarsened increased hepatic echotexture suggestive of steatosis and/or early fibrosis.   ASSESSMENT / PLAN:   Screen for colon cancer  **  - Colonoscopy  Screening  Referral    Dyslipidemia  **  - Lipid panel reflex to direct LDL Non-fasting  - Lipid panel reflex to direct LDL Non-fasting  - rosuvastatin (CRESTOR) 10 MG tablet  Dispense: 90 tablet; Refill: 3    Essential hypertension  **  - Comprehensive metabolic panel (BMP + Alb, Alk Phos, ALT, AST, Total. Bili, TP)  - CBC with platelets  - Comprehensive metabolic panel (BMP + Alb, Alk Phos, ALT, AST, Total. Bili, TP)  - CBC with platelets  - amLODIPine (NORVASC) 10 MG tablet  Dispense: 90 tablet; Refill: 4  - olmesartan (BENICAR) 40 MG tablet  Dispense: 90 tablet; Refill: 3    Screening for prostate cancer  **  - PSA, screen  - PSA, screen    ETOH abuse  **  - US Abdomen Complete  - CBC with platelets    Chronic gout without tophus, unspecified cause, unspecified site  **  - Uric acid  - Uric acid  - allopurinol (ZYLOPRIM) 300 MG tablet  Dispense: 90 tablet; Refill: 3    Benign prostatic hyperplasia without lower urinary tract symptoms  **    Nocturia  **    Visit for preventive health examination  **  - Pneumococcal 20 Valent Conjugate (PCV20)  - CBC with platelets    Family history of prostate cancer  **  - PSA, screen    Localized superficial swelling, mass, or lump  **  - US Abdomen Complete    Abnormal US (ultrasound) of abdomen  **  - US Abdomen Complete    Chronic breathlessness  **  - albuterol (PROAIR HFA/PROVENTIL HFA/VENTOLIN HFA) 108 (90 Base) MCG/ACT inhaler  Dispense: 8.5 g; Refill: 1    History of COVID-19  **    Medicare welcome exam  **    Positive colorectal cancer screening using Cologuard test  **    - he stopped Cialis related not wanting to be on chronic medication. The med was ordered to help with Nocturia since Flomax side effects were not favored for patient. Suggest he try the medication again and try to avoid liquids, bladder irritants in evening. Can see Urology  - Your PSA level is stable from last year.   Your cholesterol level has improved but your LDL is still elevated. I  "will increase your Crestor to 10 mg per day. This will aide in decreasing heart disease and strokes.     Your Creatinine is up a little bit. Can be multifactorial with muscle mass, hydration, etc. Your GFR is stable. Try to avoid NSAID's if you can and stay hydrated with water.     Other labs are all stable.    BP up in clinic and at home. Will have him stop Valsartan and try Olmesartan. Per studies, it appears this can be more effective.   Prior US reviewed with patient today. Will repeat to assess further, and make sure liver is stable. Liver enzymes are normal range. Drinking cessation discussed.   Lump noted, feels like a bone spur but in an odd place. Will see if US can capture the lump on image. Does not appear to be enlarging or problematic for patient.   Breathless. I feel may be a trigger for past Covid, pneumonia, potential PTSD. Could also be related to lungs, smoking, etc. Can use inhaler pRn. If worsening, let us know.   Pratt guard positive, importance of getting colonoscopy discussed. CBC stable. No symptoms with rectal bleeding, blood in stool, etc  He had a lot of different concerns today, suggested he return more often so questions can be answered with enough time, etc             Counseling  Reviewed preventive health counseling, as reflected in patient instructions      BMI  Estimated body mass index is 27.57 kg/m  as calculated from the following:    Height as of 10/9/23: 1.765 m (5' 9.5\").    Weight as of 10/9/23: 85.9 kg (189 lb 6.4 oz).         He reports that he has never smoked. He has never used smokeless tobacco.      Appropriate preventive services were discussed with this patient, including applicable screening as appropriate for fall prevention, nutrition, physical activity, Tobacco-use cessation, weight loss and cognition.  Checklist reviewing preventive services available has been given to the patient.    Reviewed patients plan of care and provided an AVS. The Basic Care Plan (routine " screening as documented in Health Maintenance) for Paul meets the Care Plan requirement. This Care Plan has been established and reviewed with the Patient.          Signed Electronically by: GENE Alcocer CNP    Identified Health Risks  I have reviewed Opioid Use Disorder and Substance Use Disorder risk factors and made any needed referrals.

## 2024-06-26 ENCOUNTER — TELEPHONE (OUTPATIENT)
Dept: FAMILY MEDICINE | Facility: CLINIC | Age: 64
End: 2024-06-26
Payer: MEDICARE

## 2024-06-26 LAB
ALBUMIN SERPL BCG-MCNC: 4.4 G/DL (ref 3.5–5.2)
ALP SERPL-CCNC: 122 U/L (ref 40–150)
ALT SERPL W P-5'-P-CCNC: 22 U/L (ref 0–70)
ANION GAP SERPL CALCULATED.3IONS-SCNC: 11 MMOL/L (ref 7–15)
AST SERPL W P-5'-P-CCNC: 23 U/L (ref 0–45)
BILIRUB SERPL-MCNC: 1.2 MG/DL
BUN SERPL-MCNC: 18.3 MG/DL (ref 8–23)
CALCIUM SERPL-MCNC: 9.4 MG/DL (ref 8.8–10.2)
CHLORIDE SERPL-SCNC: 104 MMOL/L (ref 98–107)
CHOLEST SERPL-MCNC: 183 MG/DL
CREAT SERPL-MCNC: 1.24 MG/DL (ref 0.67–1.17)
DEPRECATED HCO3 PLAS-SCNC: 24 MMOL/L (ref 22–29)
EGFRCR SERPLBLD CKD-EPI 2021: 65 ML/MIN/1.73M2
FASTING STATUS PATIENT QL REPORTED: ABNORMAL
FASTING STATUS PATIENT QL REPORTED: ABNORMAL
GLUCOSE SERPL-MCNC: 91 MG/DL (ref 70–99)
HDLC SERPL-MCNC: 49 MG/DL
LDLC SERPL CALC-MCNC: 117 MG/DL
NONHDLC SERPL-MCNC: 134 MG/DL
POTASSIUM SERPL-SCNC: 3.5 MMOL/L (ref 3.4–5.3)
PROT SERPL-MCNC: 7.5 G/DL (ref 6.4–8.3)
PSA SERPL DL<=0.01 NG/ML-MCNC: 2.23 NG/ML (ref 0–4.5)
SODIUM SERPL-SCNC: 139 MMOL/L (ref 135–145)
TRIGL SERPL-MCNC: 86 MG/DL
URATE SERPL-MCNC: 4 MG/DL (ref 3.4–7)

## 2024-06-26 NOTE — TELEPHONE ENCOUNTER
Test Results    Contacts       Contact Date/Time Type Contact Phone/Fax    06/26/2024 11:40 AM CDT Phone (Incoming) Paul Garcia (Self) [phone] (M)            Who ordered the test:  GENE Trevino CNP    Type of test: Lab    Date of test:  6/25/2024    Where was the test performed:  [facility]    What are your questions/concerns?:  Patient had question regarding PSA test. Advised patient that PSA was still in process.  Patient verbalized understanding.

## 2024-06-27 ENCOUNTER — TELEPHONE (OUTPATIENT)
Dept: FAMILY MEDICINE | Facility: CLINIC | Age: 64
End: 2024-06-27
Payer: MEDICARE

## 2024-06-27 PROBLEM — Z80.42 FAMILY HISTORY OF PROSTATE CANCER: Status: ACTIVE | Noted: 2024-06-27

## 2024-06-27 PROBLEM — F10.10 ETOH ABUSE: Status: ACTIVE | Noted: 2024-06-27

## 2024-06-27 RX ORDER — AMLODIPINE BESYLATE 10 MG/1
10 TABLET ORAL
Qty: 90 TABLET | Refills: 4 | Status: SHIPPED | OUTPATIENT
Start: 2024-06-27

## 2024-06-27 RX ORDER — OLMESARTAN MEDOXOMIL 40 MG/1
40 TABLET ORAL DAILY
Qty: 90 TABLET | Refills: 3 | Status: SHIPPED | OUTPATIENT
Start: 2024-06-27

## 2024-06-27 RX ORDER — ROSUVASTATIN CALCIUM 10 MG/1
10 TABLET, COATED ORAL DAILY
Qty: 90 TABLET | Refills: 3 | Status: SHIPPED | OUTPATIENT
Start: 2024-06-27

## 2024-06-27 RX ORDER — ALLOPURINOL 300 MG/1
1 TABLET ORAL
Qty: 90 TABLET | Refills: 3 | Status: SHIPPED | OUTPATIENT
Start: 2024-06-27

## 2024-06-27 NOTE — TELEPHONE ENCOUNTER
Left message for Paul to call back please relay Nicolas message and help schedule a follow up blood pressure check in a week. NICOLLE KUMAR on 6/27/2024 at 3:50 PM

## 2024-06-27 NOTE — RESULT ENCOUNTER NOTE
Jonathon Miller    Good news! Your PSA level is stable from last year.     Your cholesterol level has improved but your LDL is still elevated. I will increase your Crestor to 10 mg per day. This will aide in decreasing heart disease and strokes.     Your Creatinine is up a little bit. Can be multifactorial with muscle mass, hydration, etc. Your GFR is stable. Try to avoid NSAID's if you can and stay hydrated with water.     Other labs are all stable.     If you have questions, please let me know.     Barbara

## 2024-06-27 NOTE — TELEPHONE ENCOUNTER
Please call patient.      Since his BP was still elevated at office times three different times, and it appears at home too, I will have him stop Valsartan, and start a different medication called Olmesartan. They are in the same group of medications but Olmesartan appears a little bit more effective with BP control.      Please follow up with nurse or MA in one week     Can also go over results with him too, see my result notes     Thanks    rupert

## 2024-06-27 NOTE — TELEPHONE ENCOUNTER
Please call patient.     Since his BP was still elevated at office times three different times, and it appears at home too, I will have him stop Valsartan, and start a different medication called Olmesartan. They are in the same group of medications but Olmesartan appears a little bit more effective with BP control.     Please follow up with nurse or MA in one week    Can also go over results with him too     thanks    Barbara

## 2024-06-28 NOTE — TELEPHONE ENCOUNTER
Left vm for pt to call back, please relay message from provider and help schedule BP check for 1 week

## 2024-07-01 NOTE — TELEPHONE ENCOUNTER
07/01/24  Pt is currently scheduled 07/09 for BP check. LM for pt to call back to make sure he received Barbara Nicolas's message. Upon callback, please relay:     Since his BP was still elevated at office times three different times, and it appears at home too, I will have him stop Valsartan, and start a different medication called Olmesartan. They are in the same group of medications but Olmesartan appears a little bit more effective with BP control.   Kaela

## 2024-07-02 ENCOUNTER — HOSPITAL ENCOUNTER (OUTPATIENT)
Dept: ULTRASOUND IMAGING | Facility: CLINIC | Age: 64
Discharge: HOME | End: 2024-07-02
Attending: NURSE PRACTITIONER | Admitting: NURSE PRACTITIONER
Payer: COMMERCIAL

## 2024-07-02 ENCOUNTER — MYC REFILL (OUTPATIENT)
Dept: FAMILY MEDICINE | Facility: CLINIC | Age: 64
End: 2024-07-02
Payer: COMMERCIAL

## 2024-07-02 ENCOUNTER — NURSE TRIAGE (OUTPATIENT)
Dept: NURSING | Facility: CLINIC | Age: 64
End: 2024-07-02
Payer: COMMERCIAL

## 2024-07-02 DIAGNOSIS — Z79.899 CURRENT USE OF PROTON PUMP INHIBITOR: ICD-10-CM

## 2024-07-02 DIAGNOSIS — R12 CHRONIC HEARTBURN: ICD-10-CM

## 2024-07-02 DIAGNOSIS — F10.10 ETOH ABUSE: ICD-10-CM

## 2024-07-02 DIAGNOSIS — R22.9 LOCALIZED SUPERFICIAL SWELLING, MASS, OR LUMP: ICD-10-CM

## 2024-07-02 DIAGNOSIS — R93.5 ABNORMAL US (ULTRASOUND) OF ABDOMEN: ICD-10-CM

## 2024-07-02 PROCEDURE — 76705 ECHO EXAM OF ABDOMEN: CPT

## 2024-07-02 NOTE — TELEPHONE ENCOUNTER
Patient stated that he discussed omeprazole prescription renewal on 06/27 with Ruth Nicolas's care team. Patient went to pharmacy today to  prescriptions and omperazole was not ordered. Patient stated that he had no medication left.

## 2024-07-02 NOTE — TELEPHONE ENCOUNTER
This refill request is a duplicate request, previously received or sent.   Sent denial notification to pharmacy.  MEMO Garcia  [facility]

## 2024-07-02 NOTE — TELEPHONE ENCOUNTER
Nurse Triage SBAR    Is this a 2nd Level Triage? YES    Situation: medication refill question    Background: Patient stated that he discussed omeprazole prescription renewal on 06/27 with Ruth Nicolas's care team. Patient went to pharmacy today to  prescriptions and omperazole was not ordered. Patient stated that he has no medication left.    Assessment: NA    Protocol Recommended Disposition:   No disposition on file.    Recommendation:  Await call back from care team, refill encounter started     Routed to provider    Reason for Disposition   Caller has NON-URGENT medicine question about med that PCP prescribed and triager unable to answer question    Protocols used: Medication Refill and Renewal Call-A-[place]

## 2024-07-05 DIAGNOSIS — K82.4 GALLBLADDER POLYP: Primary | ICD-10-CM

## 2024-07-09 ENCOUNTER — ALLIED HEALTH/NURSE VISIT (OUTPATIENT)
Dept: FAMILY MEDICINE | Facility: CLINIC | Age: 64
End: 2024-07-09
Payer: COMMERCIAL

## 2024-07-09 ENCOUNTER — TELEPHONE (OUTPATIENT)
Dept: FAMILY MEDICINE | Facility: CLINIC | Age: 64
End: 2024-07-09

## 2024-07-09 VITALS — DIASTOLIC BLOOD PRESSURE: 88 MMHG | SYSTOLIC BLOOD PRESSURE: 160 MMHG | OXYGEN SATURATION: 98 % | HEART RATE: 77 BPM

## 2024-07-09 DIAGNOSIS — I10 ESSENTIAL HYPERTENSION: Primary | ICD-10-CM

## 2024-07-09 PROCEDURE — 99207 PR NO CHARGE NURSE ONLY: CPT

## 2024-07-09 RX ORDER — OLMESARTAN MEDOXOMIL 40 MG/1
40 TABLET ORAL DAILY
Qty: 30 TABLET | Refills: 0 | Status: CANCELLED | OUTPATIENT
Start: 2024-07-09

## 2024-07-09 RX ORDER — HYDROCHLOROTHIAZIDE 25 MG/1
25 TABLET ORAL DAILY
Qty: 30 TABLET | Refills: 0 | Status: SHIPPED | OUTPATIENT
Start: 2024-07-09

## 2024-07-09 NOTE — PROGRESS NOTES
Pending to provider for review.    Hydrochlorothiazide 25 mg medication pended for approval.  RN unable to sign.    Emiliana Whalen RN

## 2024-07-09 NOTE — TELEPHONE ENCOUNTER
FYI - Status Update    Who is Calling: nurseEmiliana    Update: RN called to update patient on the new medication, hydrochlorothiazide, that Ruth Nicolas prescribed today for elevated BP.  RN explained to take 1 tablet by mouth daily in the morning.  Pt agreeable to plan.     Does caller want a call/response back: No

## 2024-07-09 NOTE — PROGRESS NOTES
Paul Garcia is a 64 year old year old patient who comes in today for a Blood Pressure check because of new medication.  Vital Signs as repeated by RN         Patient is taking medication as prescribed  Patient is tolerating medications well.  Patient is monitoring Blood Pressure at home. Did not bring home BP cuff.  Average readings if yes are     158/93  152/91  152/87  146/79  150/88  144/87    Current complaints: none  Disposition:  orders received - Start hydrochlorothiazide 25 mg 1x daily in morning.     Pt thinks he started taking medication 7/5. RN scheduled an appointment for pt to come back for a BP check on 7/23. Pt will bring BP cuff to visit.    Emiliana Whalen RN

## 2024-10-26 DIAGNOSIS — N40.0 BENIGN PROSTATIC HYPERPLASIA WITHOUT LOWER URINARY TRACT SYMPTOMS: ICD-10-CM

## 2024-10-28 RX ORDER — TADALAFIL 5 MG/1
5 TABLET ORAL DAILY
Qty: 30 TABLET | Refills: 1 | Status: SHIPPED | OUTPATIENT
Start: 2024-10-28

## 2025-03-16 DIAGNOSIS — N40.0 BENIGN PROSTATIC HYPERPLASIA WITHOUT LOWER URINARY TRACT SYMPTOMS: ICD-10-CM

## 2025-03-17 ENCOUNTER — TELEPHONE (OUTPATIENT)
Dept: FAMILY MEDICINE | Facility: CLINIC | Age: 65
End: 2025-03-17
Payer: COMMERCIAL

## 2025-03-17 RX ORDER — TADALAFIL 5 MG/1
5 TABLET ORAL DAILY
Qty: 30 TABLET | Refills: 0 | Status: SHIPPED | OUTPATIENT
Start: 2025-03-17

## 2025-03-17 NOTE — TELEPHONE ENCOUNTER
Retail Pharmacy Prior Authorization Team   Phone: [phone]    PRIOR AUTHORIZATION DENIED    Medication: TADALAFIL 5 MG PO TABS  Insurance Company: [employer] - Phone [phone] Fax [phone]  Denial Date: 3/17/2025  Denial Reason(s): MUST TRY/FAIL BOTH AN ALPHA BLOCKER AND A 5-ALPHA REDUCTASE INHIBITOR      Appeal Information: IF THE PROVIDER WOULD LIKE TO APPEAL THIS DECISION PLEASE PROVIDE THE PA TEAM WITH A LETTER OF MEDICAL NECESSITY      Patient Notified: NO

## 2025-05-26 ENCOUNTER — PATIENT OUTREACH (OUTPATIENT)
Dept: CARE COORDINATION | Facility: CLINIC | Age: 65
End: 2025-05-26
Payer: COMMERCIAL

## 2025-08-10 ENCOUNTER — HEALTH MAINTENANCE LETTER (OUTPATIENT)
Age: 65
End: 2025-08-10